# Patient Record
Sex: FEMALE
[De-identification: names, ages, dates, MRNs, and addresses within clinical notes are randomized per-mention and may not be internally consistent; named-entity substitution may affect disease eponyms.]

---

## 2017-08-01 ENCOUNTER — HOSPITAL ENCOUNTER (EMERGENCY)
Dept: HOSPITAL 14 - H.ER | Age: 57
LOS: 1 days | Discharge: HOME | End: 2017-08-02
Payer: COMMERCIAL

## 2017-08-01 VITALS
RESPIRATION RATE: 16 BRPM | SYSTOLIC BLOOD PRESSURE: 142 MMHG | HEART RATE: 68 BPM | OXYGEN SATURATION: 100 % | TEMPERATURE: 97.7 F | DIASTOLIC BLOOD PRESSURE: 88 MMHG

## 2017-08-01 DIAGNOSIS — L03.312: Primary | ICD-10-CM

## 2017-08-01 LAB
ALBUMIN/GLOB SERPL: 1.6 {RATIO} (ref 1–2.1)
ALP SERPL-CCNC: 85 U/L (ref 38–126)
ALT SERPL-CCNC: 43 U/L (ref 9–52)
AST SERPL-CCNC: 25 U/L (ref 14–36)
BASOPHILS # BLD AUTO: 0.1 K/UL (ref 0–0.2)
BASOPHILS NFR BLD: 1.3 % (ref 0–2)
BILIRUB SERPL-MCNC: 0.6 MG/DL (ref 0.2–1.3)
BUN SERPL-MCNC: 13 MG/DL (ref 7–17)
CALCIUM SERPL-MCNC: 10.2 MG/DL (ref 8.4–10.2)
CHLORIDE SERPL-SCNC: 102 MMOL/L (ref 98–107)
CO2 SERPL-SCNC: 25 MMOL/L (ref 22–30)
EOSINOPHIL # BLD AUTO: 0.1 K/UL (ref 0–0.7)
EOSINOPHIL NFR BLD: 1.9 % (ref 0–4)
ERYTHROCYTE [DISTWIDTH] IN BLOOD BY AUTOMATED COUNT: 12.6 % (ref 11.5–14.5)
GLOBULIN SER-MCNC: 3 GM/DL (ref 2.2–3.9)
GLUCOSE SERPL-MCNC: 108 MG/DL (ref 65–105)
HCT VFR BLD CALC: 40 % (ref 34–47)
LYMPHOCYTES # BLD AUTO: 2.5 K/UL (ref 1–4.3)
LYMPHOCYTES NFR BLD AUTO: 38.1 % (ref 20–40)
MCH RBC QN AUTO: 29.2 PG (ref 27–31)
MCHC RBC AUTO-ENTMCNC: 33.2 G/DL (ref 33–37)
MCV RBC AUTO: 87.8 FL (ref 81–99)
MONOCYTES # BLD: 0.6 K/UL (ref 0–0.8)
MONOCYTES NFR BLD: 9.2 % (ref 0–10)
NEUTROPHILS # BLD: 3.2 K/UL (ref 1.8–7)
NEUTROPHILS NFR BLD AUTO: 49.5 % (ref 50–75)
NRBC BLD AUTO-RTO: 0 % (ref 0–0)
PLATELET # BLD: 334 K/UL (ref 130–400)
PMV BLD AUTO: 7.7 FL (ref 7.2–11.7)
POTASSIUM SERPL-SCNC: 4 MMOL/L (ref 3.6–5)
PROT SERPL-MCNC: 7.7 G/DL (ref 6.3–8.2)
SODIUM SERPL-SCNC: 137 MMOL/L (ref 132–148)
WBC # BLD AUTO: 6.5 K/UL (ref 4.8–10.8)

## 2017-08-01 NOTE — ED PDOC
HPI: Skin/Bite Injury


Time Seen by Provider: 08/01/17 22:56


Chief Complaint (Nursing): Abnormal Skin Integrity


Chief Complaint (Provider): lesion


History Per: Patient


History/Exam Limitations: no limitations


Additional Complaint(s): 





58yo F in ED for eval of tick bite and ? infection to mid back 2weeks-states 

she was seen at an ER 3d ago given one dose of doxycycline and Rx for keflex. 

PT states swelling, redness and pain has increased. denies fever chills nausea 

or vomiting. pt denies receiving lyme titer test





Past Medical History


Reviewed: Historical Data, Nursing Documentation, Vital Signs


Vital Signs: 





 Last Vital Signs











Temp  97.7 F   08/01/17 22:28


 


Pulse  68   08/01/17 22:28


 


Resp  16   08/01/17 22:28


 


BP  142/88   08/01/17 22:28


 


Pulse Ox  100   08/01/17 22:28














- Medical History


PMH: No Chronic Diseases





- Family History


Family History: States: No Known Family Hx





- Home Medications


Home Medications: 


 Ambulatory Orders











 Medication  Instructions  Recorded


 


Doxycycline Monohydrate [Mondoxyne 100 mg PO BID #24 capsule 08/02/17





Nl]  














- Allergies


Allergies/Adverse Reactions: 


 Allergies











Allergy/AdvReac Type Severity Reaction Status Date / Time


 


erythromycin base Allergy  URTICARIA Verified 08/01/17 22:28





[From Erythrocin]     














Review of Systems


ROS Statement: Except As Marked, All Systems Reviewed And Found Negative


Constitutional: Negative for: Fever, Chills


Skin: Positive for: Rash, Lesions





Physical Exam





- Reviewed


Nursing Documentation Reviewed: Yes


Vital Signs Reviewed: Yes





- Physical Exam


Appears: Positive for: Well, Non-toxic, No Acute Distress


Head Exam: Positive for: ATRAUMATIC, NORMAL INSPECTION, NORMOCEPHALIC


Skin: Positive for: Normal Color, Warm, DRY


Eye Exam: Positive for: Normal appearance, EOMI, PERRL


Cardiovascular/Chest: Positive for: Regular Rate, Rhythm


Respiratory: Positive for: CNT, Normal Breath Sounds


Extremity: Positive for: Other (upper back: ertyhema streaking warmth noted 

tenderness noted)


Neurologic/Psych: Positive for: Alert, Oriented





- Laboratory Results


Result Diagrams: 


 08/01/17 23:23





 08/01/17 23:23





- ECG


O2 Sat by Pulse Oximetry: 100





- Progress


ED Course And Treament: 





impression cellulites. 


given 1 dose of rocephin and cbc/cmp





Medical Decision Making


Medical Decision Making: 


lyme testing io


pt will get Rx for doxycycline for d/c and advised f/u with pmd in 3days if 

with worsening symptoms to return to ER 





Disposition





- Clinical Impression


Clinical Impression: 


 Cellulitis, Tick bite








- Patient ED Disposition


Is Patient to be Admitted: No


Counseled Patient/Family Regarding: Studies Performed, Diagnosis, Rx Given





- Disposition


Referrals: 


[facility] [Outside]


[facility] [Outside]


Disposition: Routine/Home


Disposition Time: 00:02


Condition: STABLE


Prescriptions: 


Doxycycline Monohydrate [Mondoxyne Nl] 100 mg PO BID #24 capsule


Instructions:  Lyme Disease (ED), Tick Bite (ED), Cellulitis (ED)


Forms:  [facility] (English)

## 2019-01-30 ENCOUNTER — HOSPITAL ENCOUNTER (EMERGENCY)
Dept: HOSPITAL 31 - C.ER | Age: 59
Discharge: HOME | End: 2019-01-30
Payer: COMMERCIAL

## 2019-01-30 VITALS — DIASTOLIC BLOOD PRESSURE: 79 MMHG | TEMPERATURE: 97.8 F | HEART RATE: 82 BPM | SYSTOLIC BLOOD PRESSURE: 119 MMHG

## 2019-01-30 VITALS — OXYGEN SATURATION: 97 %

## 2019-01-30 VITALS — RESPIRATION RATE: 20 BRPM

## 2019-01-30 DIAGNOSIS — J11.1: Primary | ICD-10-CM

## 2019-01-30 NOTE — C.PDOC
History Of Present Illness


57 y/o female presents to the ER complaining of cough which has been present for

the past 3 weeks. Patient states that she noted some streaks of blood when she 

coughed today. Denies having fever, chills, nosebleed, CP, SOB, nausea, and 

vomiting.


Time Seen by Provider: 01/30/19 07:20


Chief Complaint (Nursing): Cough, Cold, Congestion


History Per: Patient


History/Exam Limitations: no limitations


Onset/Duration Of Symptoms: Days


Current Symptoms Are (Timing): Still Present


Severity: Moderate





Past Medical History


Reviewed: Historical Data, Nursing Documentation, Vital Signs


Vital Signs: 





                                Last Vital Signs











Temp  97.9 F   01/30/19 06:24


 


Pulse  96 H  01/30/19 06:24


 


Resp  16   01/30/19 06:57


 


BP  168/91 H  01/30/19 06:24


 


Pulse Ox  92 L  01/30/19 06:57














- Medical History


PMH: No Chronic Diseases


Surgical History: No Surg Hx


Family History: States: No Known Family Hx





- Social History


Hx Alcohol Use: Yes


Hx Substance Use: No





- Immunization History


Hx Tetanus Toxoid Vaccination: No


Hx Influenza Vaccination: No


Hx Pneumococcal Vaccination: No





Review Of Systems


Except As Marked, All Systems Reviewed And Found Negative.


Constitutional: Negative for: Fever, Chills


Cardiovascular: Negative for: Chest Pain


Respiratory: Positive for: Cough.  Negative for: Shortness of Breath


Gastrointestinal: Negative for: Nausea, Vomiting





Physical Exam





- Physical Exam


Appears: Non-toxic, No Acute Distress


Skin: Normal Color, Warm, Dry


Head: Atraumatic, Normacephalic


Eye(s): bilateral: Normal Inspection


Nose: Normal


Oral Mucosa: Moist


Throat: Normal, No Erythema, No Exudate


Neck: Supple


Chest: Symmetrical


Cardiovascular: Rhythm Regular


Respiratory: Normal Breath Sounds, No Rales, No Rhonchi, No Wheezing


Neurological/Psych: Oriented x3, Normal Speech





ED Course And Treatment


O2 Sat by Pulse Oximetry: 97 (RA)


Pulse Ox Interpretation: Normal





Medical Decision Making


Medical Decision Making: 


Plan:


--EKG


--CXR


--Augmentin PO


--Albuterol





Disposition


Counseled Patient/Family Regarding: Studies Performed, Diagnosis





- Disposition


Referrals: 


[facility] [Outside]


[facility] [Outside]


Disposition: HOME/ ROUTINE


Disposition Time: 10:45


Condition: GOOD


Additional Instructions: 


Return to ED if any increase symptoms


Prescriptions: 


Albuterol HFA [Ventolin HFA 90 mcg/actuation (8 g)] 2 puff IH [id number] #2 puff


Amoxicillin/Clavulanate [Augmentin 875 MG-125 MG] 1 tab PO BID #14 tab


Instructions:  Upper Respiratory Infection (ED)


Forms:  [facility] (English)





- POA


Present On Arrival: None





- Clinical Impression


Clinical Impression: 


 Influenza-like illness








- PA / NP / Resident Statement


MD/DO has reviewed & agrees with the documentation as recorded.





- Scribe Statement


The provider has reviewed the documentation as recorded by the Marilu Qureshi





Provider Attestation





All medical record entries made by the Tristonibrenae were at my direction and 

personally dictated by me. I have reviewed the chart and agree that the record 

accurately reflects my personal performance of the history, physical exam, 

medical decision making, and the department course for this patient. I have also

personally directed, reviewed, and agree with the discharge instructions and 

disposition.

## 2019-01-30 NOTE — RAD
Chest x-ray two views 



HISTORY:

Cough. 



Comparison: None available. 



Findings: 



Biapical pleural thickening. 



Diffuse increased interstitial lung markings. 



Small nodular density at the right lung base may represent confluence 

of shadows with ribs and vessels. 



Tortuous aorta. 



Top normal heart size. 



Degenerative changes in the spine. 



Impression: 



Biapical pleural thickening. 



Diffuse increased interstitial lung markings. 



Small nodular density at the right lung base may represent confluence 

of shadows with ribs and vessels. 



Tortuous aorta.